# Patient Record
Sex: FEMALE | Race: WHITE | NOT HISPANIC OR LATINO | Employment: FULL TIME | URBAN - NONMETROPOLITAN AREA
[De-identification: names, ages, dates, MRNs, and addresses within clinical notes are randomized per-mention and may not be internally consistent; named-entity substitution may affect disease eponyms.]

---

## 2017-02-06 ENCOUNTER — OFFICE VISIT - GICH (OUTPATIENT)
Dept: FAMILY MEDICINE | Facility: OTHER | Age: 22
End: 2017-02-06

## 2017-02-06 ENCOUNTER — HISTORY (OUTPATIENT)
Dept: FAMILY MEDICINE | Facility: OTHER | Age: 22
End: 2017-02-06

## 2017-02-06 DIAGNOSIS — T78.40XA ALLERGIC STATE: ICD-10-CM

## 2017-02-06 DIAGNOSIS — L29.89 OTHER PRURITUS: ICD-10-CM

## 2017-05-05 ENCOUNTER — AMBULATORY - GICH (OUTPATIENT)
Dept: LAB | Facility: OTHER | Age: 22
End: 2017-05-05

## 2017-05-05 DIAGNOSIS — F39 MOOD DISORDER (H): ICD-10-CM

## 2017-05-05 LAB
A/G RATIO - HISTORICAL: 1.7 (ref 1–2)
ABSOLUTE BASOPHILS - HISTORICAL: 0.1 THOU/CU MM
ABSOLUTE EOSINOPHILS - HISTORICAL: 0.2 THOU/CU MM
ABSOLUTE LYMPHOCYTES - HISTORICAL: 2.8 THOU/CU MM (ref 0.9–2.9)
ABSOLUTE MONOCYTES - HISTORICAL: 0.4 THOU/CU MM
ABSOLUTE NEUTROPHILS - HISTORICAL: 6.6 THOU/CU MM (ref 1.7–7)
ALBUMIN SERPL-MCNC: 4.7 G/DL (ref 3.5–5.7)
ALP SERPL-CCNC: 95 IU/L (ref 34–104)
ALT (SGPT) - HISTORICAL: 19 IU/L (ref 7–52)
AST SERPL-CCNC: 21 IU/L (ref 13–39)
BASOPHILS # BLD AUTO: 0.6 %
BILIRUB SERPL-MCNC: 0.5 MG/DL (ref 0.3–1)
BILIRUBIN, DIRECT: 0.06 MG/DL (ref 0.03–0.18)
BILIRUBIN,INDIRECT: 0.4 MG/DL (ref 0.2–0.8)
CHOL/HDL RATIO - HISTORICAL: 6.58
CHOLESTEROL TOTAL: 283 MG/DL
EOSINOPHIL NFR BLD AUTO: 1.6 %
ERYTHROCYTE [DISTWIDTH] IN BLOOD BY AUTOMATED COUNT: 11.3 % (ref 11.5–15.5)
GLOBULIN - HISTORICAL: 2.8 G/DL (ref 2–3.7)
HCT VFR BLD AUTO: 44.7 % (ref 33–51)
HDLC SERPL-MCNC: 43 MG/DL (ref 23–92)
HEMOGLOBIN: 14.6 G/DL (ref 12–16)
LDLC SERPL CALC-MCNC: 211 MG/DL
LYMPHOCYTES NFR BLD AUTO: 28 % (ref 20–44)
MCH RBC QN AUTO: 31.3 PG (ref 26–34)
MCHC RBC AUTO-ENTMCNC: 32.6 G/DL (ref 32–36)
MCV RBC AUTO: 96 FL (ref 80–100)
MONOCYTES NFR BLD AUTO: 4.2 %
NEUTROPHILS NFR BLD AUTO: 65.6 % (ref 42–72)
NON-HDL CHOLESTEROL - HISTORICAL: 240 MG/DL
PATIENT STATUS - HISTORICAL: ABNORMAL
PLATELET # BLD AUTO: 280 THOU/CU MM (ref 140–440)
PMV BLD: 7.7 FL (ref 6.5–11)
PROT SERPL-MCNC: 7.5 G/DL (ref 6.4–8.9)
RED BLOOD COUNT - HISTORICAL: 4.65 MIL/CU MM (ref 4–5.2)
SODIUM SERPL-SCNC: 138 MMOL/L (ref 133–143)
TRIGL SERPL-MCNC: 144 MG/DL
VITAMIN D TOTAL - HISTORICAL: 12.6 NG/ML
WHITE BLOOD COUNT - HISTORICAL: 10.1 THOU/CU MM (ref 4.5–11)

## 2018-01-03 NOTE — NURSING NOTE
Patient Information     Patient Name MRN Josefina Dumas [id number] Female 1995      Nursing Note by Yadira Santos at 2017  3:15 PM     Author:  Yadira Santos Service:  (none) Author Type:  (none)     Filed:  2017  4:05 PM Encounter Date:  2017 Status:  Signed     :  Yadira Santos            Patient presents to clinic with c/o skin irritation this mrng.  Recent medication Trileptal.  Started this on Friday.  Took 50 mg of benadryl at noon today.  Yadira Santos ....................  2017   3:57 PM.

## 2018-01-03 NOTE — PROGRESS NOTES
Patient Information     Patient Name MRN Sex Josefina Green [id number] Female 1995      Progress Notes by Clara Bo NP at 2017  3:15 PM     Author:  Clara Bo NP Service:  (none) Author Type:  PHYS- Nurse Practitioner     Filed:  2017  4:57 PM Encounter Date:  2017 Status:  Signed     :  Clara Bo NP (PHYS- Nurse Practitioner)            HPI:    Josefina Lincoln is a 21 y.o. female who presents to clinic today for skin concern, possible allergic reaction.  Woke up this morning with red itchy rash on cheeks, neck, and chest.    Started new medication 3 days ago - Trileptal for mood stabilizer, states she was on it in the past without difficulty.  Last dose was this morning, normally takes twice daily.  She sees Buster Rosales (private practice in [place]) for her mental health and medication management, states she spoke to her on the phone today and was instructed to be seen in clinic.  Started birth control pills a month ago.  No new soaps, products.  No one else with rash.  Denies throat swelling or difficulty swallowing.  Denies cough, chest pain or shortness of breath.  Took Benadryl 50 mg about 4 hours ago, mild relief in redness and itching but flared back up again.          Past Medical History      Diagnosis   Date     ASCUS on Pap smear  2013 nl      Encounter for insertion of mirena IUD  2015     Interstitial cystitis       Nicotine use disorder       Pregnancy, first             Past Surgical History       Procedure   Laterality Date     Appendectomy        Cystoscopy        Pb vaginal delivery ufm only   2013             Social History      Substance Use Topics        Smoking status:  Current Every Day Smoker     Packs/day: 0.30     Types: Cigarettes     Smokeless tobacco:  Never Used     Alcohol use  No     Current Outpatient Prescriptions       Medication  Sig Dispense Refill     cloNIDine HCl (CATAPRES) 0.1 mg  "tablet Take 0.1 mg by mouth at bedtime.  2     levonorgestrel-ethinyl estrad, 0.15-30 mg-mcg, (HIRA; EDUARDOETTE-28) 0.15-0.03 mg tablet Take 1 tablet by mouth once daily. 1 Package 0     OXcarbazepine (TRILEPTAL) 300 mg tablet Take 300 mg by mouth 2 times daily.  1     No current facility-administered medications for this visit.      Medications have been reviewed by me and are current to the best of my knowledge and ability.    Allergies     Allergen  Reactions     Bactrim [Sulfamethoxazole-Trimethoprim] Hives     Zoloft [Sertraline] Hives       ROS:  Refer to HPI    Visit Vitals       /74     Pulse 95     Temp 99.6  F (37.6  C) (Tympanic)     Resp 16     Ht 1.727 m (5' 8\")     Wt 97.1 kg (214 lb)     LMP Comment: IUD was taken out 1 mos ago jan     SpO2 99%     Breastfeeding No     BMI 32.54 kg/m2       EXAM:  General Appearance: Well appearing female adolescent, appropriate appearance for age. No acute distress  Eyes: conjunctivae normal, no drainage  Orophayrnx: moist mucous membranes, posterior pharynx, tonsils without hypertrophy, no erythema, no exudates or petechiae, no post nasal drip seen.    Respiratory: normal chest wall and respirations.  Normal effort.  Clear to auscultation bilaterally, no wheezes or rhonchi or congestion, no cough appreciated   Cardiac: RRR with no murmurs  Dermatological: Bilateral cheeks, lateral neck, anterior neck and upper chest with erythematous maculopapular rash  Psychological: normal affect, alert and pleasant      ASSESSMENT/PLAN:    ICD-10-CM    1. Pruritic erythematous rash L29.8    2. Allergic reaction caused by a drug, initial encounter T78.40XA          Likely allergic reaction from Trileptal (only new medication/product)  Instructed to stop taking Trileptal today  Encouraged fluids  Symptomatic treatment - baking soda compress PRN, calamine or hydrocortisone, cool compresses, etc   Follow up with prescribing provider in 2-3 days for discussion of other treatment " options.  Follow up sooner if symptoms persist or worsen or concerns          Patient Instructions   Stop taking Trileptal today    Ok to take Benadryl every 6 hours as needed    May use calamine or hydrocortisone cream twice daily as needed    May try baking soda rub or bath     Follow up with prescribing provider on Wednesday or Thursday

## 2018-01-03 NOTE — PATIENT INSTRUCTIONS
Patient Information     Patient Name MRN Sex Josefina Green [id number] Female 1995      Patient Instructions by Clara Bo NP at 2017  3:15 PM     Author:  Clara Bo NP Service:  (none) Author Type:  PHYS- Nurse Practitioner     Filed:  2017  4:15 PM Encounter Date:  2017 Status:  Signed     :  Clara Bo NP (PHYS- Nurse Practitioner)            Stop taking Trileptal today    Ok to take Benadryl every 6 hours as needed    May use calamine or hydrocortisone cream twice daily as needed    May try baking soda rub or bath     Follow up with prescribing provider on Wednesday or Thursday

## 2018-01-24 ENCOUNTER — DOCUMENTATION ONLY (OUTPATIENT)
Dept: FAMILY MEDICINE | Facility: OTHER | Age: 23
End: 2018-01-24

## 2018-01-24 RX ORDER — CLONIDINE HYDROCHLORIDE 0.1 MG/1
0.1 TABLET ORAL AT BEDTIME
COMMUNITY
Start: 2016-10-10

## 2018-01-24 RX ORDER — OXCARBAZEPINE 300 MG/1
300 TABLET, FILM COATED ORAL 2 TIMES DAILY
COMMUNITY
Start: 2016-10-10

## 2018-01-24 RX ORDER — LEVONORGESTREL AND ETHINYL ESTRADIOL 0.15-0.03
1 KIT ORAL DAILY
COMMUNITY
Start: 2017-02-06

## 2018-01-27 VITALS
HEART RATE: 95 BPM | RESPIRATION RATE: 16 BRPM | BODY MASS INDEX: 32.43 KG/M2 | TEMPERATURE: 99.6 F | HEIGHT: 68 IN | WEIGHT: 214 LBS | DIASTOLIC BLOOD PRESSURE: 74 MMHG | OXYGEN SATURATION: 99 % | SYSTOLIC BLOOD PRESSURE: 122 MMHG

## 2018-03-25 ENCOUNTER — HEALTH MAINTENANCE LETTER (OUTPATIENT)
Age: 23
End: 2018-03-25

## 2018-10-09 ENCOUNTER — HOSPITAL ENCOUNTER (OUTPATIENT)
Facility: OTHER | Age: 23
Discharge: HOME | End: 2018-10-09
Attending: OBSTETRICS & GYNECOLOGY | Admitting: OBSTETRICS & GYNECOLOGY
Payer: COMMERCIAL

## 2018-10-09 VITALS
WEIGHT: 240 LBS | DIASTOLIC BLOOD PRESSURE: 85 MMHG | BODY MASS INDEX: 37.67 KG/M2 | OXYGEN SATURATION: 100 % | TEMPERATURE: 99 F | RESPIRATION RATE: 18 BRPM | HEART RATE: 127 BPM | HEIGHT: 67 IN | SYSTOLIC BLOOD PRESSURE: 145 MMHG

## 2018-10-09 PROBLEM — Z36.89 ENCOUNTER FOR TRIAGE IN PREGNANT PATIENT: Status: ACTIVE | Noted: 2018-10-09

## 2018-10-09 LAB
ALBUMIN UR-MCNC: NEGATIVE MG/DL
APPEARANCE UR: CLEAR
BACTERIA #/AREA URNS HPF: ABNORMAL /HPF
BILIRUB UR QL STRIP: NEGATIVE
COLOR UR AUTO: YELLOW
GLUCOSE UR STRIP-MCNC: NEGATIVE MG/DL
HGB UR QL STRIP: NEGATIVE
KETONES UR STRIP-MCNC: NEGATIVE MG/DL
LEUKOCYTE ESTERASE UR QL STRIP: ABNORMAL
NITRATE UR QL: NEGATIVE
NON-SQ EPI CELLS #/AREA URNS LPF: ABNORMAL /LPF
PH UR STRIP: 8 PH (ref 5–9)
RBC #/AREA URNS AUTO: ABNORMAL /HPF
SOURCE: ABNORMAL
SP GR UR STRIP: 1.01 (ref 1–1.03)
UROBILINOGEN UR STRIP-ACNC: 0.2 EU/DL (ref 0.2–1)
WBC #/AREA URNS AUTO: ABNORMAL /HPF

## 2018-10-09 PROCEDURE — G0463 HOSPITAL OUTPT CLINIC VISIT: HCPCS

## 2018-10-09 PROCEDURE — G0463 HOSPITAL OUTPT CLINIC VISIT: HCPCS | Mod: 25

## 2018-10-09 PROCEDURE — 81001 URINALYSIS AUTO W/SCOPE: CPT | Performed by: OBSTETRICS & GYNECOLOGY

## 2018-10-09 NOTE — ED TRIAGE NOTES
Pt comes in with elevated BP for two days greater than 150. Pt was directed by her OBGYN to come in and be evaluated.    Minna Dias RN on 10/9/2018 at 1:15 PM

## 2018-10-09 NOTE — DISCHARGE INSTRUCTIONS
Follow up with your OB/GYN next 1-2 days.    Return to clinic or hospital for concerns high blood pressures, headaches, blurred vision, vaginal bleeding, leaking amniotic fluid, or decreased fetal movement. Contact physician  for questions and concerns.    Rest and hydration today.

## 2018-10-09 NOTE — IP AVS SNAPSHOT
[facility]    [address]    [place] [address]    Phone:  [phone]    Fax:  [phone]                                       After Visit Summary   10/9/2018    Josefina Lincoln    MRN: [id number]           After Visit Summary Signature Page     I have received my discharge instructions, and my questions have been answered. I have discussed any challenges I see with this plan with the nurse or doctor.    ..........................................................................................................................................  Patient/Patient Representative Signature      ..........................................................................................................................................  Patient Representative Print Name and Relationship to Patient    ..................................................               ................................................  Date                                   Time    ..........................................................................................................................................  Reviewed by Signature/Title    ...................................................              ..............................................  Date                                               Time          22EPIC Rev 08/18

## 2018-10-09 NOTE — PROGRESS NOTES
FHT- category one tracing, reactive strip, baseline . UA negative. No uterine contractions felt by patient and no contractions traced on FHM. Blood pressure 132/68- 96-16. Phone call to Jean Paul CASON and status report given. Orders received to discharge patient home.

## 2018-10-09 NOTE — IP AVS SNAPSHOT
"                  MRN:[id number]                      After Visit Summary   10/9/2018    Josefina Lincoln    MRN: [id number]           Thank you!     Thank you for choosing [facility] for your care. Our goal is always to provide you with excellent care. Hearing back from our patients is one way we can continue to improve our services. Please take a few minutes to complete the written survey that you may receive in the mail after you visit with us. Thank you!        Patient Information     Date Of Birth          1995        About your hospital stay     You were admitted on:  October 9, 2018 You last received care in the:  [facility]    You were discharged on:  October 9, 2018       Who to Call     For medical emergencies, please call 911.  For non-urgent questions about your medical care, please call your primary care provider or clinic, [phone]          Attending Provider     Provider Specialty    Booker Marcano MD OB/Gyn       Primary Care Provider Office Phone # Fax #    Marlyn PHIPPS Bertin Tate [phone]      Further instructions from your care team       Follow up with your OB/GYN next 1-2 days.    Return to clinic or hospital for concerns high blood pressures, headaches, blurred vision, vaginal bleeding, leaking amniotic fluid, or decreased fetal movement. Contact physician  for questions and concerns.    Rest and hydration today.    Pending Results     No orders found from 10/7/2018 to 10/10/2018.            Admission Information     Date & Time Provider Department Dept. Phone    10/9/2018 Booker Marcano MD [facility] [phone]      Your Vitals Were     Blood Pressure Pulse Temperature Respirations Height Weight    145/85 127 99  F (37.2  C) (Oral) 18 1.702 m (5' 7\") 108.9 kg (240 lb)    Pulse Oximetry BMI (Body Mass Index)                100% 37.59 kg/m2          [employer] Information     [employer] lets you send messages to your " "doctor, view your test results, renew your prescriptions, schedule appointments and more. To sign up, go to [email or web address] . Click on \"Log in\" on the left side of the screen, which will take you to the Welcome page. Then click on \"Sign up Now\" on the right side of the page.     You will be asked to enter the access code listed below, as well as some personal information. Please follow the directions to create your username and password.     Your access code is: [email or web address]  Expires: 2019  2:37 PM     Your access code will  in 90 days. If you need help or a new code, please call your [facility] clinic or [phone].        Care EveryWhere ID     This is your Care EveryWhere ID. This could be used by other organizations to access your [facility] medical records  [id number]        Equal Access to Services     JUANJO GARLAND : Dhruv Mai, wagrzegorz lumariana, qaybnatacha kaalmada luiza, marlon carrillo . So [facility] [phone].    ATENCIÓN: Si habla español, tiene a joshua disposición servicios gratuitos de asistencia lingüística. Peace al [phone].    We comply with applicable federal civil rights laws and [place] laws. We do not discriminate on the basis of race, color, national origin, age, disability, sex, sexual orientation, or gender identity.               Review of your medicines      UNREVIEWED medicines. Ask your doctor about these medicines        Dose / Directions    cloNIDine 0.1 MG tablet   Commonly known as:  CATAPRES        Dose:  0.1 mg   Take 0.1 mg by mouth At Bedtime   Refills:  0       levonorgestrel-ethinyl estradiol 0.15-30 MG-MCG per tablet   Commonly known as:  NORDETTE        Dose:  1 tablet   Take 1 tablet by mouth daily   Refills:  0       OXcarbazepine 300 MG tablet   Commonly known as:  TRILEPTAL        Dose:  300 mg   Take 300 mg by mouth 2 times daily   Refills:  0                Protect others around you: Learn how to safely use, " store and throw away your medicines at www.disposemymeds.org.             Medication List: This is a list of all your medications and when to take them. Check marks below indicate your daily home schedule. Keep this list as a reference.      Medications           Morning Afternoon Evening Bedtime As Needed    cloNIDine 0.1 MG tablet   Commonly known as:  CATAPRES   Take 0.1 mg by mouth At Bedtime                                levonorgestrel-ethinyl estradiol 0.15-30 MG-MCG per tablet   Commonly known as:  NORDETTE   Take 1 tablet by mouth daily                                OXcarbazepine 300 MG tablet   Commonly known as:  TRILEPTAL   Take 300 mg by mouth 2 times daily

## 2018-10-09 NOTE — PROGRESS NOTES
"Admitted to room 403 from ER triage. Patient states she is \"31 weeks pregnant\". Patient states \" I was at work today and a co worker took my blood pressure a couple times and it was 148/85 and 151/88 and my pulse was 120 s\". On admission to [facility] blood pressure 145/-79-99.0 in semi fowlers position. Patient positioned to left lateral and blood pressure 147/-18. FHM applied on admission, . UA collected and sent to lab.  "

## 2018-10-09 NOTE — PROGRESS NOTES
Discharge education completed and both written and verbal and questions answered. Discharged home ambulatory.

## 2019-07-15 ENCOUNTER — TELEPHONE (OUTPATIENT)
Dept: INTERNAL MEDICINE | Facility: OTHER | Age: 24
End: 2019-07-15

## 2019-07-15 NOTE — TELEPHONE ENCOUNTER
Called and spoke with patient after proper verification. Informed patient of below message. Patient stated understanding and no further questions at this time.     Bonnie Robledo LPN............. July 15, 2019 1:04 PM

## 2019-07-15 NOTE — TELEPHONE ENCOUNTER
This appointment is okay.  We will plan to address depression with [facility] testing and she can continue to follow-up with Ebony Yost for ongoing care.

## 2019-07-15 NOTE — TELEPHONE ENCOUNTER
Patient would like to discuss [facility] testing. Has tentative appt on 08/27/2019. Did advise patient that [facility] is a closed practice but she wanted to attempt to schedule with since [facility] is the only one that she has heard of that does this specific testing. Patient stated that she is not looking to establish long term care with [facility]. Please call patient and advise that this appointment is okay to move forward with. Thank you!    Yanelis Montaño on 7/15/2019 at 9:45 AM

## 2022-07-08 ENCOUNTER — HOSPITAL ENCOUNTER (EMERGENCY)
Facility: OTHER | Age: 27
Discharge: HOME | End: 2022-07-08
Attending: FAMILY MEDICINE | Admitting: FAMILY MEDICINE
Payer: COMMERCIAL

## 2022-07-08 VITALS
SYSTOLIC BLOOD PRESSURE: 116 MMHG | OXYGEN SATURATION: 98 % | TEMPERATURE: 97.3 F | RESPIRATION RATE: 16 BRPM | DIASTOLIC BLOOD PRESSURE: 86 MMHG | WEIGHT: 240 LBS | HEIGHT: 68 IN | HEART RATE: 103 BPM | BODY MASS INDEX: 36.37 KG/M2

## 2022-07-08 DIAGNOSIS — N30.00 ACUTE CYSTITIS WITHOUT HEMATURIA: ICD-10-CM

## 2022-07-08 LAB
ALBUMIN UR-MCNC: NEGATIVE MG/DL
AMPHETAMINES UR QL: NOT DETECTED
ANION GAP SERPL CALCULATED.3IONS-SCNC: 9 MMOL/L (ref 3–14)
APPEARANCE UR: ABNORMAL
ATRIAL RATE - MUSE: 130 BPM
BACTERIA #/AREA URNS HPF: ABNORMAL /HPF
BARBITURATES UR QL SCN: NOT DETECTED
BASOPHILS # BLD AUTO: 0 10E3/UL (ref 0–0.2)
BASOPHILS NFR BLD AUTO: 0 %
BENZODIAZ UR QL SCN: NOT DETECTED
BILIRUB UR QL STRIP: NEGATIVE
BUN SERPL-MCNC: 10 MG/DL (ref 7–25)
BUPRENORPHINE UR QL: NOT DETECTED
CALCIUM SERPL-MCNC: 9.7 MG/DL (ref 8.6–10.3)
CANNABINOIDS UR QL: NOT DETECTED
CHLORIDE BLD-SCNC: 105 MMOL/L (ref 98–107)
CO2 SERPL-SCNC: 25 MMOL/L (ref 21–31)
COCAINE UR QL SCN: NOT DETECTED
COLOR UR AUTO: ABNORMAL
CREAT SERPL-MCNC: 0.75 MG/DL (ref 0.6–1.2)
D-METHAMPHET UR QL: NOT DETECTED
DIASTOLIC BLOOD PRESSURE - MUSE: NORMAL MMHG
EOSINOPHIL # BLD AUTO: 0.1 10E3/UL (ref 0–0.7)
EOSINOPHIL NFR BLD AUTO: 1 %
ERYTHROCYTE [DISTWIDTH] IN BLOOD BY AUTOMATED COUNT: 12.8 % (ref 10–15)
ETHANOL SERPL-MCNC: <0.01 G/DL
GFR SERPL CREATININE-BSD FRML MDRD: >90 ML/MIN/1.73M2
GLUCOSE BLD-MCNC: 117 MG/DL (ref 70–105)
GLUCOSE UR STRIP-MCNC: NEGATIVE MG/DL
HCG UR QL: NEGATIVE
HCT VFR BLD AUTO: 37.5 % (ref 35–47)
HGB BLD-MCNC: 12.6 G/DL (ref 11.7–15.7)
HGB UR QL STRIP: NEGATIVE
IMM GRANULOCYTES # BLD: 0 10E3/UL
IMM GRANULOCYTES NFR BLD: 0 %
INTERPRETATION ECG - MUSE: NORMAL
KETONES UR STRIP-MCNC: NEGATIVE MG/DL
LEUKOCYTE ESTERASE UR QL STRIP: ABNORMAL
LYMPHOCYTES # BLD AUTO: 4.2 10E3/UL (ref 0.8–5.3)
LYMPHOCYTES NFR BLD AUTO: 41 %
MCH RBC QN AUTO: 29.7 PG (ref 26.5–33)
MCHC RBC AUTO-ENTMCNC: 33.6 G/DL (ref 31.5–36.5)
MCV RBC AUTO: 88 FL (ref 78–100)
METHADONE UR QL SCN: NOT DETECTED
MONOCYTES # BLD AUTO: 0.7 10E3/UL (ref 0–1.3)
MONOCYTES NFR BLD AUTO: 7 %
NEUTROPHILS # BLD AUTO: 5.3 10E3/UL (ref 1.6–8.3)
NEUTROPHILS NFR BLD AUTO: 51 %
NITRATE UR QL: NEGATIVE
NRBC # BLD AUTO: 0 10E3/UL
NRBC BLD AUTO-RTO: 0 /100
OPIATES UR QL SCN: NOT DETECTED
OXYCODONE UR QL SCN: NOT DETECTED
P AXIS - MUSE: 28 DEGREES
PCP UR QL SCN: NOT DETECTED
PH UR STRIP: 7 [PH] (ref 5–9)
PLATELET # BLD AUTO: 291 10E3/UL (ref 150–450)
POTASSIUM BLD-SCNC: 3.4 MMOL/L (ref 3.5–5.1)
PR INTERVAL - MUSE: 138 MS
PROPOXYPH UR QL: NOT DETECTED
QRS DURATION - MUSE: 88 MS
QT - MUSE: 314 MS
QTC - MUSE: 462 MS
R AXIS - MUSE: 15 DEGREES
RBC # BLD AUTO: 4.24 10E6/UL (ref 3.8–5.2)
RBC URINE: 2 /HPF
SODIUM SERPL-SCNC: 139 MMOL/L (ref 134–144)
SP GR UR STRIP: 1.01 (ref 1–1.03)
SQUAMOUS EPITHELIAL: 6 /HPF
SYSTOLIC BLOOD PRESSURE - MUSE: NORMAL MMHG
T AXIS - MUSE: 26 DEGREES
TRICYCLICS UR QL SCN: NOT DETECTED
TROPONIN I SERPL-MCNC: <2.4 PG/ML (ref 0–34)
UROBILINOGEN UR STRIP-MCNC: NORMAL MG/DL
VENTRICULAR RATE- MUSE: 130 BPM
WBC # BLD AUTO: 10.4 10E3/UL (ref 4–11)
WBC URINE: 11 /HPF

## 2022-07-08 PROCEDURE — 87086 URINE CULTURE/COLONY COUNT: CPT | Performed by: FAMILY MEDICINE

## 2022-07-08 PROCEDURE — 93010 ELECTROCARDIOGRAM REPORT: CPT | Performed by: INTERNAL MEDICINE

## 2022-07-08 PROCEDURE — 81001 URINALYSIS AUTO W/SCOPE: CPT | Performed by: FAMILY MEDICINE

## 2022-07-08 PROCEDURE — 99283 EMERGENCY DEPT VISIT LOW MDM: CPT | Mod: 25 | Performed by: FAMILY MEDICINE

## 2022-07-08 PROCEDURE — 250N000013 HC RX MED GY IP 250 OP 250 PS 637: Performed by: FAMILY MEDICINE

## 2022-07-08 PROCEDURE — 81025 URINE PREGNANCY TEST: CPT | Performed by: FAMILY MEDICINE

## 2022-07-08 PROCEDURE — 99283 EMERGENCY DEPT VISIT LOW MDM: CPT | Performed by: FAMILY MEDICINE

## 2022-07-08 PROCEDURE — 93005 ELECTROCARDIOGRAM TRACING: CPT | Mod: RTG

## 2022-07-08 PROCEDURE — 96360 HYDRATION IV INFUSION INIT: CPT | Performed by: FAMILY MEDICINE

## 2022-07-08 PROCEDURE — 82077 ASSAY SPEC XCP UR&BREATH IA: CPT | Performed by: FAMILY MEDICINE

## 2022-07-08 PROCEDURE — 84484 ASSAY OF TROPONIN QUANT: CPT | Performed by: FAMILY MEDICINE

## 2022-07-08 PROCEDURE — 36415 COLL VENOUS BLD VENIPUNCTURE: CPT | Performed by: FAMILY MEDICINE

## 2022-07-08 PROCEDURE — 85025 COMPLETE CBC W/AUTO DIFF WBC: CPT | Performed by: FAMILY MEDICINE

## 2022-07-08 PROCEDURE — 80306 DRUG TEST PRSMV INSTRMNT: CPT | Performed by: FAMILY MEDICINE

## 2022-07-08 PROCEDURE — 258N000003 HC RX IP 258 OP 636: Performed by: FAMILY MEDICINE

## 2022-07-08 PROCEDURE — 80048 BASIC METABOLIC PNL TOTAL CA: CPT | Performed by: FAMILY MEDICINE

## 2022-07-08 RX ORDER — CEPHALEXIN 500 MG/1
500 CAPSULE ORAL 3 TIMES DAILY
Qty: 15 CAPSULE | Refills: 0 | Status: SHIPPED | OUTPATIENT
Start: 2022-07-08 | End: 2022-07-13

## 2022-07-08 RX ORDER — ACETAMINOPHEN 500 MG
1000 TABLET ORAL ONCE
Status: COMPLETED | OUTPATIENT
Start: 2022-07-08 | End: 2022-07-08

## 2022-07-08 RX ADMIN — SODIUM CHLORIDE 500 ML: 9 INJECTION, SOLUTION INTRAVENOUS at 03:21

## 2022-07-08 RX ADMIN — SODIUM CHLORIDE 500 ML: 0.9 INJECTION, SOLUTION INTRAVENOUS at 01:18

## 2022-07-08 RX ADMIN — CEPHALEXIN 500 MG: 250 CAPSULE ORAL at 03:48

## 2022-07-08 RX ADMIN — ACETAMINOPHEN 1000 MG: 500 TABLET ORAL at 02:55

## 2022-07-08 ASSESSMENT — ENCOUNTER SYMPTOMS
CHEST TIGHTNESS: 1
PALPITATIONS: 1

## 2022-07-08 NOTE — ED TRIAGE NOTES
"Pt states she has felt \"off\" since 1800 yesterday.  Pt feels some chest pressure and has a high heart rate.  Pt doesnot have a hx of cardiac issues.  Pt denies N/V or dizziness.  Pt report pain is more heavy not painful 2/10.  Pt had 1 alcohol beverage before the chest pressure started and did not have any after that.     Triage Assessment     Row Name 07/08/22 0054       Triage Assessment (Adult)    Airway WDL WDL       Respiratory WDL    Respiratory WDL WDL       Skin Circulation/Temperature WDL    Skin Circulation/Temperature WDL WDL       Peripheral/Neurovascular WDL    Peripheral Neurovascular WDL WDL       Cognitive/Neuro/Behavioral WDL    Cognitive/Neuro/Behavioral WDL WDL              "

## 2022-07-08 NOTE — ED PROVIDER NOTES
History     Chief Complaint   Patient presents with     Chest Pain     The history is provided by the patient and medical records.     Josefina Lincoln is a 26 year old female here with concerns about her high heart rate and chest heaviness. Onset of symptoms was about 6 PM, seven hours ago. She tried drinking water, tried lying down and tried Valsalva but that did not help. No fever, no chills, no SOB, no cough, no N/V/D. No urine symptoms.   She has been in quarantine for the past two weeks after a positive COVID test.     She has a history of mood disorder and obesity. She is on clonidine at HS and Trileptal BID but I do not see a diagnosis of seizures on her chart.     Allergies:  Allergies   Allergen Reactions     Sulfamethoxazole-Trimethoprim Hives     Sertraline Hives     Varenicline Hives     Oxcarbazepine Rash       Problem List:    Patient Active Problem List    Diagnosis Date Noted     Encounter for triage in pregnant patient 10/09/2018     Priority: Medium     Obesity 12/02/2015     Priority: Medium     Contraception 01/23/2014     Priority: Medium     Mood disorder (H) 01/07/2014     Priority: Medium     Interstitial cystitis 09/20/2013     Priority: Medium     ASCUS on Pap smear 04/19/2013     Priority: Medium     Nicotine use disorder 04/03/2013     Priority: Medium        Past Medical History:    Past Medical History:   Diagnosis Date     Abnormal cytological findings in specimens from other organs, systems and tissues      Chronic interstitial cystitis without hematuria      Encounter for insertion of intrauterine contraceptive device      Encounter for supervision of normal first pregnancy      Nicotine dependence, uncomplicated        Past Surgical History:    Past Surgical History:   Procedure Laterality Date     APPENDECTOMY OPEN      2008     CYSTOSCOPY      2012     OTHER SURGICAL HISTORY      11/12/2013,,PB VAGINAL DELIVERY [facility] ONLY       Family History:    Family History   Problem  "Relation Age of Onset     Other - See Comments Father         blood clot disorder, possible factor V/Stroke     Heart Disease Father         Heart Disease     Thyroid Disease Mother         Thyroid Disease     Other - See Comments Sister         factor V       Social History:  Marital Status:  Single [1]  Social History     Tobacco Use     Smoking status: Current Every Day Smoker     Packs/day: 0.30     Types: Vaping Device     Smokeless tobacco: Never Used   Substance Use Topics     Alcohol use: Yes     Drug use: Unknown     Types: Other     Comment: Drug use: No        Medications:    cephALEXin (KEFLEX) 500 MG capsule  cloNIDine (CATAPRES) 0.1 MG tablet  levonorgestrel-ethinyl estradiol (NORDETTE) 0.15-30 MG-MCG per tablet  OXcarbazepine (TRILEPTAL) 300 MG tablet          Review of Systems   Respiratory: Positive for chest tightness.    Cardiovascular: Positive for palpitations.   All other systems reviewed and are negative.      Physical Exam   BP: (!) 178/101  Pulse: (!) 136  Temp: 97.3  F (36.3  C)  Resp: 16  Height: 172.7 cm (5' 8\")  Weight: 108.9 kg (240 lb)  SpO2: 100 %      Physical Exam  Vitals and nursing note reviewed.   Constitutional:       General: She is not in acute distress.     Appearance: She is well-developed. She is obese. She is not ill-appearing.   Cardiovascular:      Rate and Rhythm: Regular rhythm. Tachycardia present.      Pulses:           Radial pulses are 2+ on the right side and 2+ on the left side.      Heart sounds: Normal heart sounds.   Pulmonary:      Effort: Pulmonary effort is normal. No respiratory distress.      Breath sounds: Normal breath sounds.   Abdominal:      General: Bowel sounds are normal.      Palpations: Abdomen is soft.      Tenderness: There is no abdominal tenderness.   Musculoskeletal:      Right lower leg: No tenderness. No edema.      Left lower leg: No edema.   Skin:     General: Skin is warm and dry.   Neurological:      General: No focal deficit present. "      Mental Status: She is alert and oriented to person, place, and time.   Psychiatric:         Mood and Affect: Mood normal.         Behavior: Behavior normal.       EKG: sinus tachycardia, rate 130, normal axis, NPTC    Results for orders placed or performed during the hospital encounter of 07/08/22 (from the past 24 hour(s))   Troponin I   Result Value Ref Range    Troponin I <2.4 0.0 - 34.0 pg/mL   CBC with platelets differential    Narrative    The following orders were created for panel order CBC with platelets differential.  Procedure                               Abnormality         Status                     ---------                               -----------         ------                     CBC with platelets and d...[[id number]]                      Final result                 Please view results for these tests on the individual orders.   Basic metabolic panel   Result Value Ref Range    Sodium 139 134 - 144 mmol/L    Potassium 3.4 (L) 3.5 - 5.1 mmol/L    Chloride 105 98 - 107 mmol/L    Carbon Dioxide (CO2) 25 21 - 31 mmol/L    Anion Gap 9 3 - 14 mmol/L    Urea Nitrogen 10 7 - 25 mg/dL    Creatinine 0.75 0.60 - 1.20 mg/dL    Calcium 9.7 8.6 - 10.3 mg/dL    Glucose 117 (H) 70 - 105 mg/dL    GFR Estimate >90 >60 mL/min/1.73m2   Ethanol GH   Result Value Ref Range    Alcohol ethyl <0.01 <=0.01 g/dL   CBC with platelets and differential   Result Value Ref Range    WBC Count 10.4 4.0 - 11.0 10e3/uL    RBC Count 4.24 3.80 - 5.20 10e6/uL    Hemoglobin 12.6 11.7 - 15.7 g/dL    Hematocrit 37.5 35.0 - 47.0 %    MCV 88 78 - 100 fL    MCH 29.7 26.5 - 33.0 pg    MCHC 33.6 31.5 - 36.5 g/dL    RDW 12.8 10.0 - 15.0 %    Platelet Count 291 150 - 450 10e3/uL    % Neutrophils 51 %    % Lymphocytes 41 %    % Monocytes 7 %    % Eosinophils 1 %    % Basophils 0 %    % Immature Granulocytes 0 %    NRBCs per 100 WBC 0 <1 /100    Absolute Neutrophils 5.3 1.6 - 8.3 10e3/uL    Absolute Lymphocytes 4.2 0.8 - 5.3 10e3/uL     Absolute Monocytes 0.7 0.0 - 1.3 10e3/uL    Absolute Eosinophils 0.1 0.0 - 0.7 10e3/uL    Absolute Basophils 0.0 0.0 - 0.2 10e3/uL    Absolute Immature Granulocytes 0.0 <=0.4 10e3/uL    Absolute NRBCs 0.0 10e3/uL   UA with Microscopic reflex to Culture    Specimen: Urine, Midstream   Result Value Ref Range    Color Urine Light Yellow Colorless, Straw, Light Yellow, Yellow    Appearance Urine Slightly Cloudy (A) Clear    Glucose Urine Negative Negative mg/dL    Bilirubin Urine Negative Negative    Ketones Urine Negative Negative mg/dL    Specific Gravity Urine 1.011 1.000 - 1.030    Blood Urine Negative Negative    pH Urine 7.0 5.0 - 9.0    Protein Albumin Urine Negative Negative mg/dL    Urobilinogen Urine Normal Normal, 2.0 mg/dL    Nitrite Urine Negative Negative    Leukocyte Esterase Urine Large (A) Negative    Bacteria Urine Few (A) None Seen /HPF    RBC Urine 2 <=2 /HPF    WBC Urine 11 (H) <=5 /HPF    Squamous Epithelials Urine 6 (H) <=1 /HPF    Narrative    Urine Culture ordered based on laboratory criteria   HCG qualitative urine (UPT)   Result Value Ref Range    hCG Urine Qualitative Negative Negative   Urine Drugs of Abuse Screen    Narrative    The following orders were created for panel order Urine Drugs of Abuse Screen.  Procedure                               Abnormality         Status                     ---------                               -----------         ------                     Urine Drugs of Abuse Scr...[[id number]]  Normal              Final result                 Please view results for these tests on the individual orders.   Urine Drugs of Abuse Screen Panel 13   Result Value Ref Range    Cannabinoids (73-top-9-carboxy-9-THC) Not Detected Not Detected    Phencyclidine Not Detected Not Detected    Cocaine (Benzoylecgonine) Not Detected Not Detected    Methamphetamine (d-Methamphetamine) Not Detected Not Detected    Opiates (Morphine) Not Detected Not Detected    Amphetamine  "(d-Amphetamine) Not Detected Not Detected    Benzodiazepines (Nordiazepam) Not Detected Not Detected    Tricyclic Antidepressants (Desipramine) Not Detected Not Detected    Methadone Not Detected Not Detected    Barbiturates (Butalbital) Not Detected Not Detected    Oxycodone Not Detected Not Detected    Propoxyphene (Norpropoxyphene) Not Detected Not Detected    Buprenorphine Not Detected Not Detected       Medications   0.9% sodium chloride BOLUS (500 mLs Intravenous New Bag 7/8/22 0321)   cephALEXin (KEFLEX) capsule 500 mg (has no administration in time range)   0.9% sodium chloride BOLUS (0 mLs Intravenous Stopped 7/8/22 0248)   acetaminophen (TYLENOL) tablet 1,000 mg (1,000 mg Oral Given 7/8/22 0255)       Assessments & Plan (with Medical Decision Making)  Josefina Lincoln is a 26 year old female here with concerns about her high heart rate and chest heaviness. Onset of symptoms was about 6 PM, seven hours ago. She tried drinking water, tried lying down and tried Valsalva but that did not help. No fever, no chills, no SOB, no cough, no N/V/D. No urine symptoms.   She has been in quarantine for the past two weeks after a positive COVID test.  She has a history of mood disorder and obesity. She is on clonidine at HS and Trileptal BID but I do not see a diagnosis of seizures on her chart. VS in the ED confirms tachycardia /83   Pulse 105   Temp 97.3  F (36.3  C) (Temporal)   Resp 17   Ht 1.727 m (5' 8\")   Wt 108.9 kg (240 lb)   LMP  (LMP Unknown)   SpO2 98%   BMI 36.49 kg/m    Exam shows no focal findings- she has tachycardia with normal heart sounds, normal lung sounds, normal bowel sounds with no tenderness, no LE edema.  EKG shows a sinus tachycardia with rate 130, NPTC.  Labs show CBC normal, BMP with K 3.4, glucose 117, troponin normal, ethanol zero, UA is a dirty catch but implies UTI (UC pending), UPT negative, UDS negative.  She has done better with the IV fluids and her heart rate is down into " the normal range. I think she has a UTI and we will treat with Keflex, first dose in the ED and I will send a paper Rx with her.      I have reviewed the nursing notes.    I have reviewed the findings, diagnosis, plan and need for follow up with the patient.       New Prescriptions    CEPHALEXIN (KEFLEX) 500 MG CAPSULE    Take 1 capsule (500 mg) by mouth 3 times daily for 5 days       Final diagnoses:   Acute cystitis without hematuria       7/8/2022   [facility], Gómez Celeste MD  07/08/22 0339

## 2022-07-09 LAB — BACTERIA UR CULT: NORMAL

## 2025-02-23 ENCOUNTER — HOSPITAL ENCOUNTER (EMERGENCY)
Facility: OTHER | Age: 30
Discharge: HOME | End: 2025-02-23
Attending: STUDENT IN AN ORGANIZED HEALTH CARE EDUCATION/TRAINING PROGRAM

## 2025-02-23 VITALS
HEART RATE: 75 BPM | RESPIRATION RATE: 14 BRPM | TEMPERATURE: 96.7 F | OXYGEN SATURATION: 96 % | DIASTOLIC BLOOD PRESSURE: 64 MMHG | SYSTOLIC BLOOD PRESSURE: 101 MMHG

## 2025-02-23 DIAGNOSIS — F43.21 ADJUSTMENT DISORDER WITH DEPRESSED MOOD: ICD-10-CM

## 2025-02-23 DIAGNOSIS — R41.82 ALTERED MENTAL STATUS, UNSPECIFIED ALTERED MENTAL STATUS TYPE: ICD-10-CM

## 2025-02-23 LAB
ALBUMIN SERPL BCG-MCNC: 5.1 G/DL (ref 3.5–5.2)
ALBUMIN UR-MCNC: NEGATIVE MG/DL
ALP SERPL-CCNC: 112 U/L (ref 40–150)
ALT SERPL W P-5'-P-CCNC: 21 U/L (ref 0–50)
AMPHETAMINES UR QL SCN: ABNORMAL
ANION GAP SERPL CALCULATED.3IONS-SCNC: 14 MMOL/L (ref 7–15)
APPEARANCE UR: CLEAR
AST SERPL W P-5'-P-CCNC: 30 U/L (ref 0–45)
ATRIAL RATE - MUSE: 110 BPM
BARBITURATES UR QL SCN: ABNORMAL
BASOPHILS # BLD AUTO: 0 10E3/UL (ref 0–0.2)
BASOPHILS NFR BLD AUTO: 0 %
BENZODIAZ UR QL SCN: ABNORMAL
BILIRUB DIRECT SERPL-MCNC: 0.1 MG/DL (ref 0–0.3)
BILIRUB SERPL-MCNC: 0.3 MG/DL
BILIRUB UR QL STRIP: NEGATIVE
BUN SERPL-MCNC: 6.3 MG/DL (ref 6–20)
BZE UR QL SCN: ABNORMAL
CALCIUM SERPL-MCNC: 9.8 MG/DL (ref 8.8–10.4)
CANNABINOIDS UR QL SCN: ABNORMAL
CHLORIDE SERPL-SCNC: 101 MMOL/L (ref 98–107)
COLOR UR AUTO: YELLOW
CREAT SERPL-MCNC: 0.65 MG/DL (ref 0.51–0.95)
DIASTOLIC BLOOD PRESSURE - MUSE: NORMAL MMHG
EGFRCR SERPLBLD CKD-EPI 2021: >90 ML/MIN/1.73M2
EOSINOPHIL # BLD AUTO: 0.1 10E3/UL (ref 0–0.7)
EOSINOPHIL NFR BLD AUTO: 1 %
ERYTHROCYTE [DISTWIDTH] IN BLOOD BY AUTOMATED COUNT: 12.1 % (ref 10–15)
ETHANOL SERPL-MCNC: 0.05 G/DL
FENTANYL UR QL: ABNORMAL
GLUCOSE SERPL-MCNC: 102 MG/DL (ref 70–99)
GLUCOSE UR STRIP-MCNC: NEGATIVE MG/DL
HCG INTACT+B SERPL-ACNC: <1 MIU/ML
HCO3 SERPL-SCNC: 25 MMOL/L (ref 22–29)
HCT VFR BLD AUTO: 38.6 % (ref 35–47)
HGB BLD-MCNC: 13.4 G/DL (ref 11.7–15.7)
HGB UR QL STRIP: NEGATIVE
HOLD SPECIMEN: NORMAL
IMM GRANULOCYTES # BLD: 0 10E3/UL
IMM GRANULOCYTES NFR BLD: 0 %
INTERPRETATION ECG - MUSE: NORMAL
KETONES UR STRIP-MCNC: NEGATIVE MG/DL
LACTATE SERPL-SCNC: 1.5 MMOL/L (ref 0.7–2)
LACTATE SERPL-SCNC: 3 MMOL/L (ref 0.7–2)
LEUKOCYTE ESTERASE UR QL STRIP: NEGATIVE
LIPASE SERPL-CCNC: 27 U/L (ref 13–60)
LYMPHOCYTES # BLD AUTO: 3.1 10E3/UL (ref 0.8–5.3)
LYMPHOCYTES NFR BLD AUTO: 35 %
MAGNESIUM SERPL-MCNC: 2.3 MG/DL (ref 1.7–2.3)
MCH RBC QN AUTO: 31.5 PG (ref 26.5–33)
MCHC RBC AUTO-ENTMCNC: 34.7 G/DL (ref 31.5–36.5)
MCV RBC AUTO: 91 FL (ref 78–100)
MONOCYTES # BLD AUTO: 0.4 10E3/UL (ref 0–1.3)
MONOCYTES NFR BLD AUTO: 5 %
MUCOUS THREADS #/AREA URNS LPF: PRESENT /LPF
NEUTROPHILS # BLD AUTO: 5.3 10E3/UL (ref 1.6–8.3)
NEUTROPHILS NFR BLD AUTO: 59 %
NITRATE UR QL: NEGATIVE
NRBC # BLD AUTO: 0 10E3/UL
NRBC BLD AUTO-RTO: 0 /100
OPIATES UR QL SCN: ABNORMAL
P AXIS - MUSE: 56 DEGREES
PCP QUAL URINE (ROCHE): ABNORMAL
PH UR STRIP: 6.5 [PH] (ref 5–9)
PLATELET # BLD AUTO: 310 10E3/UL (ref 150–450)
POTASSIUM SERPL-SCNC: 3.1 MMOL/L (ref 3.4–5.3)
PR INTERVAL - MUSE: 118 MS
PROT SERPL-MCNC: 8.3 G/DL (ref 6.4–8.3)
QRS DURATION - MUSE: 96 MS
QT - MUSE: 378 MS
QTC - MUSE: 511 MS
R AXIS - MUSE: 69 DEGREES
RBC # BLD AUTO: 4.25 10E6/UL (ref 3.8–5.2)
RBC URINE: 0 /HPF
SODIUM SERPL-SCNC: 140 MMOL/L (ref 135–145)
SP GR UR STRIP: 1.01 (ref 1–1.03)
SYSTOLIC BLOOD PRESSURE - MUSE: NORMAL MMHG
T AXIS - MUSE: 19 DEGREES
UROBILINOGEN UR STRIP-MCNC: NORMAL MG/DL
VENTRICULAR RATE- MUSE: 110 BPM
WBC # BLD AUTO: 8.9 10E3/UL (ref 4–11)
WBC URINE: <1 /HPF

## 2025-02-23 PROCEDURE — 83690 ASSAY OF LIPASE: CPT | Performed by: STUDENT IN AN ORGANIZED HEALTH CARE EDUCATION/TRAINING PROGRAM

## 2025-02-23 PROCEDURE — 96375 TX/PRO/DX INJ NEW DRUG ADDON: CPT | Performed by: STUDENT IN AN ORGANIZED HEALTH CARE EDUCATION/TRAINING PROGRAM

## 2025-02-23 PROCEDURE — 99284 EMERGENCY DEPT VISIT MOD MDM: CPT | Performed by: STUDENT IN AN ORGANIZED HEALTH CARE EDUCATION/TRAINING PROGRAM

## 2025-02-23 PROCEDURE — 80048 BASIC METABOLIC PNL TOTAL CA: CPT | Performed by: STUDENT IN AN ORGANIZED HEALTH CARE EDUCATION/TRAINING PROGRAM

## 2025-02-23 PROCEDURE — 36415 COLL VENOUS BLD VENIPUNCTURE: CPT | Performed by: STUDENT IN AN ORGANIZED HEALTH CARE EDUCATION/TRAINING PROGRAM

## 2025-02-23 PROCEDURE — 83605 ASSAY OF LACTIC ACID: CPT | Performed by: STUDENT IN AN ORGANIZED HEALTH CARE EDUCATION/TRAINING PROGRAM

## 2025-02-23 PROCEDURE — 84702 CHORIONIC GONADOTROPIN TEST: CPT | Performed by: STUDENT IN AN ORGANIZED HEALTH CARE EDUCATION/TRAINING PROGRAM

## 2025-02-23 PROCEDURE — 250N000011 HC RX IP 250 OP 636: Performed by: STUDENT IN AN ORGANIZED HEALTH CARE EDUCATION/TRAINING PROGRAM

## 2025-02-23 PROCEDURE — 85014 HEMATOCRIT: CPT | Performed by: STUDENT IN AN ORGANIZED HEALTH CARE EDUCATION/TRAINING PROGRAM

## 2025-02-23 PROCEDURE — 96361 HYDRATE IV INFUSION ADD-ON: CPT | Performed by: STUDENT IN AN ORGANIZED HEALTH CARE EDUCATION/TRAINING PROGRAM

## 2025-02-23 PROCEDURE — 82077 ASSAY SPEC XCP UR&BREATH IA: CPT | Performed by: STUDENT IN AN ORGANIZED HEALTH CARE EDUCATION/TRAINING PROGRAM

## 2025-02-23 PROCEDURE — 80307 DRUG TEST PRSMV CHEM ANLYZR: CPT | Performed by: STUDENT IN AN ORGANIZED HEALTH CARE EDUCATION/TRAINING PROGRAM

## 2025-02-23 PROCEDURE — 82248 BILIRUBIN DIRECT: CPT | Performed by: STUDENT IN AN ORGANIZED HEALTH CARE EDUCATION/TRAINING PROGRAM

## 2025-02-23 PROCEDURE — 93005 ELECTROCARDIOGRAM TRACING: CPT | Performed by: STUDENT IN AN ORGANIZED HEALTH CARE EDUCATION/TRAINING PROGRAM

## 2025-02-23 PROCEDURE — 85025 COMPLETE CBC W/AUTO DIFF WBC: CPT | Performed by: STUDENT IN AN ORGANIZED HEALTH CARE EDUCATION/TRAINING PROGRAM

## 2025-02-23 PROCEDURE — 83735 ASSAY OF MAGNESIUM: CPT | Performed by: STUDENT IN AN ORGANIZED HEALTH CARE EDUCATION/TRAINING PROGRAM

## 2025-02-23 PROCEDURE — 96368 THER/DIAG CONCURRENT INF: CPT | Performed by: STUDENT IN AN ORGANIZED HEALTH CARE EDUCATION/TRAINING PROGRAM

## 2025-02-23 PROCEDURE — 99284 EMERGENCY DEPT VISIT MOD MDM: CPT | Mod: 25 | Performed by: STUDENT IN AN ORGANIZED HEALTH CARE EDUCATION/TRAINING PROGRAM

## 2025-02-23 PROCEDURE — 96365 THER/PROPH/DIAG IV INF INIT: CPT | Performed by: STUDENT IN AN ORGANIZED HEALTH CARE EDUCATION/TRAINING PROGRAM

## 2025-02-23 PROCEDURE — 81003 URINALYSIS AUTO W/O SCOPE: CPT | Performed by: STUDENT IN AN ORGANIZED HEALTH CARE EDUCATION/TRAINING PROGRAM

## 2025-02-23 PROCEDURE — 93010 ELECTROCARDIOGRAM REPORT: CPT | Performed by: INTERNAL MEDICINE

## 2025-02-23 PROCEDURE — 258N000003 HC RX IP 258 OP 636: Performed by: STUDENT IN AN ORGANIZED HEALTH CARE EDUCATION/TRAINING PROGRAM

## 2025-02-23 RX ORDER — TOPIRAMATE 25 MG/1
25 TABLET, FILM COATED ORAL
COMMUNITY
Start: 2024-10-25

## 2025-02-23 RX ORDER — POTASSIUM CHLORIDE 7.45 MG/ML
10 INJECTION INTRAVENOUS ONCE
Status: COMPLETED | OUTPATIENT
Start: 2025-02-23 | End: 2025-02-23

## 2025-02-23 RX ORDER — OMEPRAZOLE 40 MG/1
40 CAPSULE, DELAYED RELEASE ORAL
COMMUNITY
Start: 2024-04-20

## 2025-02-23 RX ORDER — PHENTERMINE HYDROCHLORIDE 15 MG/1
15 CAPSULE ORAL
COMMUNITY
Start: 2024-05-03

## 2025-02-23 RX ORDER — MAGNESIUM SULFATE HEPTAHYDRATE 40 MG/ML
2 INJECTION, SOLUTION INTRAVENOUS ONCE
Status: COMPLETED | OUTPATIENT
Start: 2025-02-23 | End: 2025-02-23

## 2025-02-23 RX ORDER — CYCLOBENZAPRINE HCL 10 MG
10 TABLET ORAL
COMMUNITY
Start: 2023-08-22

## 2025-02-23 RX ORDER — PHENTERMINE HYDROCHLORIDE 30 MG/1
30 CAPSULE ORAL
COMMUNITY
Start: 2024-09-17

## 2025-02-23 RX ORDER — SUCRALFATE ORAL 1 G/10ML
SUSPENSION ORAL
COMMUNITY
Start: 2024-04-21

## 2025-02-23 RX ORDER — HYDROXYZINE PAMOATE 25 MG/1
25 CAPSULE ORAL 3 TIMES DAILY PRN
Qty: 15 CAPSULE | Refills: 0 | Status: SHIPPED | OUTPATIENT
Start: 2025-02-23 | End: 2025-02-23

## 2025-02-23 RX ORDER — LORAZEPAM 2 MG/ML
1 INJECTION INTRAMUSCULAR ONCE
Status: COMPLETED | OUTPATIENT
Start: 2025-02-23 | End: 2025-02-23

## 2025-02-23 RX ORDER — OLANZAPINE 10 MG/1
5 INJECTION, POWDER, LYOPHILIZED, FOR SOLUTION INTRAMUSCULAR
Status: DISCONTINUED | OUTPATIENT
Start: 2025-02-23 | End: 2025-02-23

## 2025-02-23 RX ORDER — OLANZAPINE 10 MG/1
5 INJECTION, POWDER, LYOPHILIZED, FOR SOLUTION INTRAMUSCULAR ONCE
Status: COMPLETED | OUTPATIENT
Start: 2025-02-23 | End: 2025-02-23

## 2025-02-23 RX ORDER — CLONIDINE HYDROCHLORIDE 0.2 MG/1
TABLET ORAL
COMMUNITY
Start: 2024-04-29

## 2025-02-23 RX ORDER — FLUOXETINE HYDROCHLORIDE 40 MG/1
80 CAPSULE ORAL DAILY
COMMUNITY
Start: 2025-01-08

## 2025-02-23 RX ADMIN — MAGNESIUM SULFATE HEPTAHYDRATE 2 G: 2 INJECTION, SOLUTION INTRAVENOUS at 02:32

## 2025-02-23 RX ADMIN — SODIUM CHLORIDE 1000 ML: 0.9 INJECTION, SOLUTION INTRAVENOUS at 02:13

## 2025-02-23 RX ADMIN — POTASSIUM CHLORIDE 10 MEQ: 7.46 INJECTION, SOLUTION INTRAVENOUS at 02:43

## 2025-02-23 RX ADMIN — SODIUM CHLORIDE 1000 ML: 9 INJECTION, SOLUTION INTRAVENOUS at 02:28

## 2025-02-23 RX ADMIN — OLANZAPINE 5 MG: 10 INJECTION, POWDER, FOR SOLUTION INTRAMUSCULAR at 02:09

## 2025-02-23 RX ADMIN — LORAZEPAM 1 MG: 2 INJECTION INTRAMUSCULAR; INTRAVENOUS at 02:12

## 2025-02-23 ASSESSMENT — ACTIVITIES OF DAILY LIVING (ADL)
ADLS_ACUITY_SCORE: 41

## 2025-02-23 ASSESSMENT — COLUMBIA-SUICIDE SEVERITY RATING SCALE - C-SSRS
6. HAVE YOU EVER DONE ANYTHING, STARTED TO DO ANYTHING, OR PREPARED TO DO ANYTHING TO END YOUR LIFE?: YES
2. HAVE YOU ACTUALLY HAD ANY THOUGHTS OF KILLING YOURSELF IN THE PAST MONTH?: NO
1. IN THE PAST MONTH, HAVE YOU WISHED YOU WERE DEAD OR WISHED YOU COULD GO TO SLEEP AND NOT WAKE UP?: NO

## 2025-02-23 NOTE — DISCHARGE INSTRUCTIONS
You were seen today for intoxication and abdominal pain. You were given Olanzapine and Ativan which helped your symptoms and allowed you to rest.    Your labs showed signs of low electrolytes and we gave you treatments for these in your IV.    If you feel under stress, many resources can help such as talk therapy and psychiatry. If you have mental health concerns for your own safety recommend you go to the Emergency Department.    Please give us a call or come back to the ER if you have any concerns.

## 2025-02-23 NOTE — ED NOTES
Pt ambulated 200 feet in the hallway. States her abdomen is not hurting anymore. Pt called her sister o pick her up. States she is ready for discharge.

## 2025-02-23 NOTE — ED NOTES
This writer was stand by assist while patient up to commode, patient was steady on her feet. This writer attempted a po challenge and patient declined at this time.

## 2025-02-23 NOTE — ED TRIAGE NOTES
ED Nursing Triage Note (General)   ________________________________    Josefina DIANE Latonia is a 29 year old Female that presents to triage private car  with history of  alcohol intoxication and abd pain reported by friend. Symptoms started roughly 1 hour ago. Interventions prior to arrival include none. Patient states that she was drinking alcohol, did one line of cocaine, and some marijuana   There were no vitals taken for this visit.t  Patient appears alert  and oriented    GCS Total = 15    Action taken:  roomed             Triage Assessment (Adult)       Row Name 02/23/25 0200          Triage Assessment    Airway WDL WDL        Respiratory WDL    Respiratory WDL WDL        Skin Circulation/Temperature WDL    Skin Circulation/Temperature WDL WDL        Cardiac WDL    Cardiac WDL WDL        Peripheral/Neurovascular WDL    Peripheral Neurovascular WDL WDL        Cognitive/Neuro/Behavioral WDL    Cognitive/Neuro/Behavioral WDL WDL

## 2025-02-23 NOTE — ED PROVIDER NOTES
History     Chief Complaint   Patient presents with    Alcohol Intoxication    Abdominal Pain     HPI  Josefina Lincoln is a 29 year old female who I assumed care of at shift change. She was using alcohol, THC and cocaine prior to arrival in part due to a stressful divorce. She is not seeking detox. She has support of family.     Allergies:  Allergies   Allergen Reactions    Sulfamethoxazole-Trimethoprim Hives    Sertraline Hives    Sulfamethoxazole-Trimethoprim Hives and Nausea and Vomiting    Varenicline Hives    Oxcarbazepine Rash       Problem List:    Patient Active Problem List    Diagnosis Date Noted    Encounter for triage in pregnant patient 10/09/2018     Priority: Medium    Obesity 12/02/2015     Priority: Medium    Contraception 01/23/2014     Priority: Medium    Mood disorder 01/07/2014     Priority: Medium    Interstitial cystitis 09/20/2013     Priority: Medium    ASCUS on Pap smear 04/19/2013     Priority: Medium    Nicotine use disorder 04/03/2013     Priority: Medium        Past Medical History:    Past Medical History:   Diagnosis Date    Abnormal cytological findings in specimens from other organs, systems and tissues     Chronic interstitial cystitis without hematuria     Encounter for insertion of intrauterine contraceptive device     Encounter for supervision of normal first pregnancy     Nicotine dependence, uncomplicated        Past Surgical History:    Past Surgical History:   Procedure Laterality Date    APPENDECTOMY OPEN      2008    CYSTOSCOPY      2012    OTHER SURGICAL HISTORY      11/12/2013,,PB VAGINAL DELIVERY [facility] ONLY       Family History:    Family History   Problem Relation Age of Onset    Other - See Comments Father         blood clot disorder, possible factor V/Stroke    Heart Disease Father         Heart Disease    Thyroid Disease Mother         Thyroid Disease    Other - See Comments Sister         factor V       Social History:  Marital Status:  Single [1]  Social  Addended by: CINDY RICHARDS on: 12/16/2019 10:34 AM     Modules accepted: Orders     History     Tobacco Use    Smoking status: Every Day     Current packs/day: 0.30     Types: Vaping Device, Cigarettes    Smokeless tobacco: Never   Substance Use Topics    Alcohol use: Yes    Drug use: Unknown     Types: Other     Comment: Drug use: No        Medications:    cloNIDine (CATAPRES) 0.2 MG tablet  cyclobenzaprine (FLEXERIL) 10 MG tablet  FLUoxetine (PROZAC) 40 MG capsule  omeprazole (PRILOSEC) 40 MG DR capsule  phentermine 15 MG capsule  phentermine 30 MG capsule  sucralfate (CARAFATE) 1 GM/10ML suspension  topiramate (TOPAMAX) 25 MG tablet  cloNIDine (CATAPRES) 0.1 MG tablet  etonogestrel (NEXPLANON) 68 MG IMPL  FLUoxetine (PROZAC) 20 MG capsule  levonorgestrel-ethinyl estradiol (NORDETTE) 0.15-30 MG-MCG per tablet  OXcarbazepine (TRILEPTAL) 300 MG tablet          Review of Systems    Physical Exam   BP: (!) 164/151  Pulse: 108  Temp: (!) 96.7  F (35.9  C)  Resp: 20  SpO2: 97 %      Physical Exam    ED Course        Procedures              Critical Care time:  none     IV Antibiotics given and/or elevated Lactate of 3 and no sepsis note found - Delete this reminder and enter the sepsis note or '.edcms' before signing chart.>>>None         Results for orders placed or performed during the hospital encounter of 02/23/25 (from the past 24 hours)   Extra Tube ([place] Draw)    Narrative    The following orders were created for panel order Extra Tube ([place] Draw).  Procedure                               Abnormality         Status                     ---------                               -----------         ------                     Extra Blue Top Tube[[id number]]                              Final result               Extra Red Top Tube[[id number]]                               Final result               Extra Green Top (Lithium...[[id number]]                      Final result               Extra Purple Top Tube[[id number]]                            Final result               Extra Green Top  (Lithium...[[id number]]                      Final result                 Please view results for these tests on the individual orders.   Extra Blue Top Tube   Result Value Ref Range    Hold Specimen JIC    Extra Red Top Tube   Result Value Ref Range    Hold Specimen JIC    Extra Green Top (Lithium Heparin) Tube   Result Value Ref Range    Hold Specimen JIC    Extra Purple Top Tube   Result Value Ref Range    Hold Specimen JIC    Extra Green Top (Lithium Heparin) ON ICE   Result Value Ref Range    Hold Specimen JIC    CBC with platelets differential    Narrative    The following orders were created for panel order CBC with platelets differential.  Procedure                               Abnormality         Status                     ---------                               -----------         ------                     CBC with platelets and d...[[id number]]                      Final result                 Please view results for these tests on the individual orders.   Basic metabolic panel   Result Value Ref Range    Sodium 140 135 - 145 mmol/L    Potassium 3.1 (L) 3.4 - 5.3 mmol/L    Chloride 101 98 - 107 mmol/L    Carbon Dioxide (CO2) 25 22 - 29 mmol/L    Anion Gap 14 7 - 15 mmol/L    Urea Nitrogen 6.3 6.0 - 20.0 mg/dL    Creatinine 0.65 0.51 - 0.95 mg/dL    GFR Estimate >90 >60 mL/min/1.73m2    Calcium 9.8 8.8 - 10.4 mg/dL    Glucose 102 (H) 70 - 99 mg/dL   Lipase   Result Value Ref Range    Lipase 27 13 - 60 U/L   Lactic acid whole blood with 1x repeat in 2 hr when >2   Result Value Ref Range    Lactic Acid, Initial 3.0 (H) 0.7 - 2.0 mmol/L   Magnesium   Result Value Ref Range    Magnesium 2.3 1.7 - 2.3 mg/dL   Ethyl Alcohol Level   Result Value Ref Range    Alcohol ethyl 0.05 (H) <=0.01 g/dL   CBC with platelets and differential   Result Value Ref Range    WBC Count 8.9 4.0 - 11.0 10e3/uL    RBC Count 4.25 3.80 - 5.20 10e6/uL    Hemoglobin 13.4 11.7 - 15.7 g/dL    Hematocrit 38.6 35.0 - 47.0 %    MCV 91 78 - 100  fL    MCH 31.5 26.5 - 33.0 pg    MCHC 34.7 31.5 - 36.5 g/dL    RDW 12.1 10.0 - 15.0 %    Platelet Count 310 150 - 450 10e3/uL    % Neutrophils 59 %    % Lymphocytes 35 %    % Monocytes 5 %    % Eosinophils 1 %    % Basophils 0 %    % Immature Granulocytes 0 %    NRBCs per 100 WBC 0 <1 /100    Absolute Neutrophils 5.3 1.6 - 8.3 10e3/uL    Absolute Lymphocytes 3.1 0.8 - 5.3 10e3/uL    Absolute Monocytes 0.4 0.0 - 1.3 10e3/uL    Absolute Eosinophils 0.1 0.0 - 0.7 10e3/uL    Absolute Basophils 0.0 0.0 - 0.2 10e3/uL    Absolute Immature Granulocytes 0.0 <=0.4 10e3/uL    Absolute NRBCs 0.0 10e3/uL   Hepatic panel   Result Value Ref Range    Protein Total 8.3 6.4 - 8.3 g/dL    Albumin 5.1 3.5 - 5.2 g/dL    Bilirubin Total 0.3 <=1.2 mg/dL    Alkaline Phosphatase 112 40 - 150 U/L    AST 30 0 - 45 U/L    ALT 21 0 - 50 U/L    Bilirubin Direct 0.10 0.00 - 0.30 mg/dL   HCG quantitative pregnancy (blood)   Result Value Ref Range    hCG Quantitative <1 <5 mIU/mL   UA with Microscopic reflex to Culture    Specimen: Urine, Clean Catch   Result Value Ref Range    Color Urine Yellow Colorless, Straw, Light Yellow, Yellow    Appearance Urine Clear Clear    Glucose Urine Negative Negative mg/dL    Bilirubin Urine Negative Negative    Ketones Urine Negative Negative mg/dL    Specific Gravity Urine 1.006 1.000 - 1.030    Blood Urine Negative Negative    pH Urine 6.5 5.0 - 9.0    Protein Albumin Urine Negative Negative mg/dL    Urobilinogen Urine Normal Normal, 2.0 mg/dL    Nitrite Urine Negative Negative    Leukocyte Esterase Urine Negative Negative    Mucus Urine Present (A) None Seen /LPF    RBC Urine 0 <=2 /HPF    WBC Urine <1 <=5 /HPF    Narrative    Urine Culture not indicated   Urine Drug Screen    Narrative    The following orders were created for panel order Urine Drug Screen.  Procedure                               Abnormality         Status                     ---------                               -----------         ------                      Urine Drug Screen Panel[[id number]]      Abnormal            Final result                 Please view results for these tests on the individual orders.   Urine Drug Screen Panel   Result Value Ref Range    Amphetamines Urine Screen Negative Screen Negative    Barbituates Urine Screen Negative Screen Negative    Benzodiazepine Urine Screen Negative Screen Negative    Cannabinoids Urine Screen Positive (A) Screen Negative    Cocaine Urine Screen Positive (A) Screen Negative    Fentanyl Qual Urine Screen Negative Screen Negative    Opiates Urine Screen Negative Screen Negative    PCP Urine Screen Negative Screen Negative   Lactic acid whole blood   Result Value Ref Range    Lactic Acid 1.5 0.7 - 2.0 mmol/L       Medications   OLANZapine (zyPREXA) IV injection 5 mg (5 mg Intravenous $Given 2/23/25 0209)   sodium chloride 0.9% BOLUS 1,000 mL (0 mLs Intravenous Stopped 2/23/25 0351)   LORazepam (ATIVAN) injection 1 mg (1 mg Intravenous $Given 2/23/25 0212)   sodium chloride 0.9% BOLUS 1,000 mL (0 mLs Intravenous Stopped 2/23/25 0558)   magnesium sulfate 2 g in 50 mL sterile water intermittent infusion (0 g Intravenous Stopped 2/23/25 0350)   potassium chloride 10 mEq in 100 mL sterile water infusion (0 mEq Intravenous Stopped 2/23/25 0350)       Assessments & Plan (with Medical Decision Making)     I have reviewed the nursing notes.    I have reviewed the findings, diagnosis, plan and need for follow up with the patient.           Medical Decision Making  The patient's presentation was of low complexity (an acute and uncomplicated illness or injury).    The patient's evaluation involved:  history and exam without other MDM data elements    The patient's management necessitated moderate risk (prescription drug management including medications given in the ED).        Current Discharge Medication List          Final diagnoses:   Altered mental status, unspecified altered mental status type   Adjustment  disorder with depressed mood   Patient ambulated without assistance. more alert. Discharge home. Declined detox.    2/23/2025   [facility]       Olivia Castro DO  02/23/25 0731

## 2025-02-23 NOTE — ED NOTES
Patient did take a sip of water and was able to keep it down. This writer encouraged her to drink more and patient declined.

## (undated) RX ORDER — MAGNESIUM SULFATE HEPTAHYDRATE 40 MG/ML
INJECTION, SOLUTION INTRAVENOUS
Status: DISPENSED
Start: 2025-02-23

## (undated) RX ORDER — LORAZEPAM 2 MG/ML
INJECTION INTRAMUSCULAR
Status: DISPENSED
Start: 2025-02-23

## (undated) RX ORDER — OLANZAPINE 10 MG/2ML
INJECTION, POWDER, FOR SOLUTION INTRAMUSCULAR
Status: DISPENSED
Start: 2025-02-23

## (undated) RX ORDER — ACETAMINOPHEN 500 MG
TABLET ORAL
Status: DISPENSED
Start: 2022-07-08

## (undated) RX ORDER — POTASSIUM CHLORIDE 7.45 MG/ML
INJECTION INTRAVENOUS
Status: DISPENSED
Start: 2025-02-23